# Patient Record
Sex: FEMALE | Race: WHITE | NOT HISPANIC OR LATINO
[De-identification: names, ages, dates, MRNs, and addresses within clinical notes are randomized per-mention and may not be internally consistent; named-entity substitution may affect disease eponyms.]

---

## 2022-02-15 ENCOUNTER — TRANSCRIPTION ENCOUNTER (OUTPATIENT)
Age: 42
End: 2022-02-15

## 2023-05-15 ENCOUNTER — APPOINTMENT (OUTPATIENT)
Dept: GASTROENTEROLOGY | Facility: CLINIC | Age: 43
End: 2023-05-15
Payer: MEDICAID

## 2023-05-15 DIAGNOSIS — K76.0 FATTY (CHANGE OF) LIVER, NOT ELSEWHERE CLASSIFIED: ICD-10-CM

## 2023-05-15 PROCEDURE — 91200 LIVER ELASTOGRAPHY: CPT

## 2023-05-15 NOTE — ASSESSMENT
[FreeTextEntry1] : FibroScan/ Vibration Controlled Transient Elastography (VCTE) Report\par \par PROBE SIZE: M\par \par INDICATION: NAFLD/PENA\par \par REFERRING PHYSICIAN: Dr. Jacky Reddy\par \par  \par PROCEDURE:\par \par Patient was NPO for 4 hours prior to procedure. After providing oral explanations of the procedure to the patient, patient was placed in supine position with right arm in maximum abduction to allow optimal exposure of right lateral abdomen. Patient abdomen was briefly assessed to identify to the terminus of the xyphoid process. The ideal target testing spot was located mid-line and lateral to this point. To acquire proper signals, the skin to liver capsule distance was accessed with the FibroScan probe. Patient was instructed to breathe normally and to abstain from sudden movements during the procedure. Ten shear waves were produced and measurements of the speed of each wave evaluated. Patient tolerated the procedure well and was discharged without incident.\par \par  \par FINDINGS:\par \par - Median shear wave speed of 1.63 meters/second.\par \par - This corresponds to a median Liver Stiffness Score of 8.0 kPa.\par \par - IQR/med 10%\par \par -  dB/m\par  \par RESULTS:\par \par FIBROSIS: F0/F1 Fibrosis\par \par STEATOSIS: S0 - Stage (0-10% steatosis) \par \par The results regarding fibrosis stage and/or steatosis grade is based on current published scientific literature and the provided patient’s diagnosis. Any further medical or surgical intervention should be made by considering the overall medical condition of the patient.

## 2024-09-23 ENCOUNTER — APPOINTMENT (OUTPATIENT)
Dept: NEUROLOGY | Facility: CLINIC | Age: 44
End: 2024-09-23
Payer: COMMERCIAL

## 2024-09-23 VITALS
WEIGHT: 175 LBS | DIASTOLIC BLOOD PRESSURE: 71 MMHG | OXYGEN SATURATION: 99 % | SYSTOLIC BLOOD PRESSURE: 105 MMHG | HEIGHT: 65 IN | HEART RATE: 68 BPM | BODY MASS INDEX: 29.16 KG/M2

## 2024-09-23 DIAGNOSIS — N20.0 CALCULUS OF KIDNEY: ICD-10-CM

## 2024-09-23 DIAGNOSIS — G44.52 NEW DAILY PERSISTENT HEADACHE (NDPH): ICD-10-CM

## 2024-09-23 DIAGNOSIS — Z82.0 FAMILY HISTORY OF EPILEPSY AND OTHER DISEASES OF THE NERVOUS SYSTEM: ICD-10-CM

## 2024-09-23 DIAGNOSIS — K21.9 GASTRO-ESOPHAGEAL REFLUX DISEASE W/OUT ESOPHAGITIS: ICD-10-CM

## 2024-09-23 PROCEDURE — 99205 OFFICE O/P NEW HI 60 MIN: CPT

## 2024-09-23 RX ORDER — OMEPRAZOLE 40 MG/1
40 CAPSULE, DELAYED RELEASE ORAL
Refills: 0 | Status: ACTIVE | COMMUNITY

## 2024-09-23 RX ORDER — MULTIVIT-MIN/IRON/FOLIC ACID/K 18-600-40
CAPSULE ORAL
Refills: 0 | Status: ACTIVE | COMMUNITY

## 2024-09-23 RX ORDER — NABUMETONE 500 MG/1
500 TABLET, FILM COATED ORAL
Qty: 10 | Refills: 0 | Status: COMPLETED | COMMUNITY
Start: 2024-09-23 | End: 2024-09-28

## 2024-09-24 NOTE — HISTORY OF PRESENT ILLNESS
[___ On how many days in the last 3 months did you miss work or school because of your headaches?] : On how many days in the last 3 months did you miss work or school because of your headaches? [unfilled] day(s) [___ How many days in the last 3 months was your productivity at work or school reduced by half or more] : How many days in the last 3 months was your productivity at work or school reduced by half or more because of your headaches? [unfilled] day(s) [___ On how many days in the last 3 months did you not do household work because of your headaches?] : On how many days in the last 3 months did you not do household work because of your headaches? [unfilled] day(s) [___ How many days in the last 3 months was your productivity in household work reduced by half or more] : How many days in the last 3 months was your productivity in household work reduced by half or more because of headaches? [unfilled] day(s) [___ On how many days in the last 3 months did you miss family, social or leisure activities because of your headaches?] : On how many days in the last 3 months did you miss family, social or leisure activities because of your headaches? [unfilled] day(s) [___ On how many days in the last 3 months did you have a headache?] : On how many days in the last 3 months did you have a headache? [unfilled] days [___ On a scale of 1-10, on average how painful were these headaches? (0 = no pain and 10 = pain as bad as it can be)] : On a scale of 1-10, on average how painful were these headaches? [unfilled] of 10 [11 to 20, MIDAS Grade III, Moderate disability] : 11 to 20, MIDAS Grade III, Moderate disability [FreeTextEntry1] : Hiral Wahl is a 44 year old female with medical history significant for GERD, kidney stones, preDM, presenting today for post ED visit.  [place] [facility] 9/22/24: "Patient states that her headache has been happened since Friday started with a pressure behind both eyes, and having some photophobia. Denies history of migraines. States that she has some family members who have had headaches, and also have had cysts removed and she thinks they were intracranial. She states that the headache was not maximal at onset within the 1st 5 minutes and gradually got worse over the last 2 days. Denies any vomiting but states she is very nauseous. Took an ibuprofen yesterday with minimal relief."  HAs began: Friday 9/20. Quality: pounding, or throbbing, holocranial.  Severity:10/10 Disability: yes Duration: from friday to sunday. Frequency: first time this past weekend. Temporal course: gradual build up.  Time of day: started Friday evening.  Pain Free between Attacks: not since Friday. Triggers: none known.  Relieving factors: lay down in dark Exacerbating factors: physical activity.   Prodrome or postdrome: none.  Aura: none   Associated Symptoms: Nausea + Vomiting - Photophobia + Phonophobia: + Osmophobia - Allodynia - Blurred Vision -  Neck pain -  Vertigo - .   Autonomic features: slight congestion even before HA. no eye redness   Treatment present: Acute: OTC analgesia minimal relief. migraine cocktail helpful 90% improvement of headache.     Imaging:  CT HEAD - No acute intracranial hemorrhage, mass effect, or midline shift. If there is concern for acute neurologic compromise, recommend MRI of the brain for further evaluation.   Labs: 9/22/24 Mg 1.8     Additional Social/Work History: Occupation: .  Habits: Caffeine: 1 green tea  ETOH: none Tobacco: none ,  Drugs: none Exercise: not in past month. usually hot sauna workout.  Diet: last wednesday started intermittent fasting, but since HA started back normal diet hydration: 3 bottles water.  Ophthalmologic: yearly checks  Sleep: HA interrupted on Friday. In general poor sleep.  Dental: not UTD Sinus/Allergies: seasonal allergies.  Head Trauma: none  Sex Active/Pregnancy planning: contraception: Menstruation: irregular since 14 y/o Associated with menses: had LMP 2 weeks ago    FH: cousin with aneurysm another cousin with migraine.

## 2024-09-24 NOTE — ASSESSMENT
[FreeTextEntry1] : 44 year old female with medical history significant for GERD, kidney stones, preDM, presenting today for post ED visit. Endorsing HAs onset 9/20/24, constant, pounding/throbbing, holocranial, severe, disabling, daily and no pain free time since onset. Never had HAs before this. A/w nausea, photo/phonophobia, some relief with OTC analgesia and ED migraine cocktail, however pain is still constant. +FMHx intracranial aneurysm and cyst.  CT HEAD - No acute intracranial hemorrhage, mass effect, or midline shift. Labs - Mg, CBC, CMP WNL Physical exam no focal neuro deficit.  Based on clinical history and presentation, patient with new daily persistent headache, they meet criteria for migraine headaches however since this is a new headache we will perform further workup to rule out structural causes.   Recommendations: - start nabumetone 500 mg 1 tab BID with food for NDPH, for 5 days  - pt to call after nabumetone course and give me an update. - consider low dose TCA at next visit - MRI, MRA, MRV head to evaluate for structural or vessel abnormalities - work letter given - lifestyle modifications - regular meals, increase hydration, adequate sleep  Patient to return to office in 1 month, or sooner if needed. Patient understands to seek urgent medical evaluation for any new or worsening symptoms.

## 2024-09-24 NOTE — HISTORY OF PRESENT ILLNESS
[___ On how many days in the last 3 months did you miss work or school because of your headaches?] : On how many days in the last 3 months did you miss work or school because of your headaches? [unfilled] day(s) [___ How many days in the last 3 months was your productivity at work or school reduced by half or more] : How many days in the last 3 months was your productivity at work or school reduced by half or more because of your headaches? [unfilled] day(s) [___ On how many days in the last 3 months did you not do household work because of your headaches?] : On how many days in the last 3 months did you not do household work because of your headaches? [unfilled] day(s) [___ How many days in the last 3 months was your productivity in household work reduced by half or more] : How many days in the last 3 months was your productivity in household work reduced by half or more because of headaches? [unfilled] day(s) [___ On how many days in the last 3 months did you miss family, social or leisure activities because of your headaches?] : On how many days in the last 3 months did you miss family, social or leisure activities because of your headaches? [unfilled] day(s) [___ On how many days in the last 3 months did you have a headache?] : On how many days in the last 3 months did you have a headache? [unfilled] days [___ On a scale of 1-10, on average how painful were these headaches? (0 = no pain and 10 = pain as bad as it can be)] : On a scale of 1-10, on average how painful were these headaches? [unfilled] of 10 [11 to 20, MIDAS Grade III, Moderate disability] : 11 to 20, MIDAS Grade III, Moderate disability [FreeTextEntry1] : Hiral Wahl is a 44 year old female with medical history significant for GERD, kidney stones, preDM, presenting today for post ED visit.  [place] [facility] 9/22/24: "Patient states that her headache has been happened since Friday started with a pressure behind both eyes, and having some photophobia. Denies history of migraines. States that she has some family members who have had headaches, and also have had cysts removed and she thinks they were intracranial. She states that the headache was not maximal at onset within the 1st 5 minutes and gradually got worse over the last 2 days. Denies any vomiting but states she is very nauseous. Took an ibuprofen yesterday with minimal relief."  HAs began: Friday 9/20. Quality: pounding, or throbbing, holocranial.  Severity:10/10 Disability: yes Duration: from friday to sunday. Frequency: first time this past weekend. Temporal course: gradual build up.  Time of day: started Friday evening.  Pain Free between Attacks: not since Friday. Triggers: none known.  Relieving factors: lay down in dark Exacerbating factors: physical activity.   Prodrome or postdrome: none.  Aura: none   Associated Symptoms: Nausea + Vomiting - Photophobia + Phonophobia: + Osmophobia - Allodynia - Blurred Vision -  Neck pain -  Vertigo - .   Autonomic features: slight congestion even before HA. no eye redness   Treatment present: Acute: OTC analgesia minimal relief. migraine cocktail helpful 90% improvement of headache.     Imaging:  CT HEAD - No acute intracranial hemorrhage, mass effect, or midline shift. If there is concern for acute neurologic compromise, recommend MRI of the brain for further evaluation.   Labs: 9/22/24 Mg 1.8     Additional Social/Work History: Occupation: .  Habits: Caffeine: 1 green tea  ETOH: none Tobacco: none ,  Drugs: none Exercise: not in past month. usually hot sauna workout.  Diet: last wednesday started intermittent fasting, but since HA started back normal diet hydration: 3 bottles water.  Ophthalmologic: yearly checks  Sleep: HA interrupted on Friday. In general poor sleep.  Dental: not UTD Sinus/Allergies: seasonal allergies.  Head Trauma: none  Sex Active/Pregnancy planning: contraception: Menstruation: irregular since 12 y/o Associated with menses: had LMP 2 weeks ago    FH: cousin with aneurysm another cousin with migraine.

## 2024-09-24 NOTE — PHYSICAL EXAM
[FreeTextEntry1] : NEURO: Mental Status Oriented to person, place, time, and situation Speech is clear, fluent, and spontaneous. Comprehension and memory intact intermittent tearfulness.   Cranial Nerves Visual fields full to confrontation Pupils equal, round, reactive to light. Extraocular movements intact. No nystagmus or ptosis. Facial sensation intact and symmetric in V1, V2, V3 Facial movement intact and symmetric Uvula midline, soft palate elevates normally Bilateral shoulder shrug intact Tongue midline, no tongue bite sign or deviation on protrusion  No craniofacial tenderness to palpation No palpable or audible TMJ crepitus.  NECK: ROM intact without pain   Motor Tone and bulk intact Shoulder abduction: 5/5 b/l Elbow flexion/extension: 5/5 bl Hand : 5/5 b/l Hip flexion/extension: 5/5 b/l Knee flexion/extension: 5/5 b/l Dorsiflexion/plantar flexion: 5/5 b/l No pronator drift   Sensation Light touch grossly intact   Deep Tendon Reflexes Biceps 2+ b/l Brachioradialis 2+ b/l Patellar 2+ b/l Ankle 2+ b/l   Coordination Normal finger to nose bilaterally No past pointing No tremor appreciated.   Gait Normal stance, stride, and pivot turn Tandem walk intact Heel and toe gait intact. Negative Romberg.     GEN: awake, alert, interactive, no acute distress EYES: sclera white, conjunctiva clear, no redness or discharge ENT: normal appearing outer ears, hearing grossly intact PULM: normal respiratory rhythm and effort, speaking in full sentences without distress, no accessory muscle usage EXT: moving all extremities spontaneously

## 2024-09-24 NOTE — DATA REVIEWED
[FreeTextEntry1] : CT HEAD 9/22/24 FINDINGS VENTRICLES AND SULCI: Normal in size and configuration. INTRA-AXIAL: No mass effect, acute hemorrhage, or midline shift. EXTRA-AXIAL: No mass or fluid collection. Basal cisterns are normal in appearance. VISUALIZED SINUSES:  Clear. TYMPANOMASTOID CAVITIES:  Clear. VISUALIZED ORBITS: Normal. CALVARIUM: Intact. MISCELLANEOUS: None. IMPRESSION: No acute intracranial hemorrhage, mass effect, or midline shift. If there is concern for acute neurologic compromise, recommend MRI of the brain for further evaluation.

## 2024-09-25 NOTE — REASON FOR VISIT
Quality 226: Preventive Care And Screening: Tobacco Use: Screening And Cessation Intervention: Patient screened for tobacco use and is an ex/non-smoker Detail Level: Detailed [FreeTextEntry2] : FibroScan Procedure Visit

## 2024-10-14 ENCOUNTER — APPOINTMENT (OUTPATIENT)
Dept: MRI IMAGING | Facility: CLINIC | Age: 44
End: 2024-10-14

## 2024-10-14 PROCEDURE — A9585: CPT | Mod: JW

## 2024-10-14 PROCEDURE — A9585: CPT

## 2024-10-14 PROCEDURE — 70553 MRI BRAIN STEM W/O & W/DYE: CPT

## 2024-10-14 PROCEDURE — 70546 MR ANGIOGRAPH HEAD W/O&W/DYE: CPT | Mod: 59

## 2024-10-28 ENCOUNTER — APPOINTMENT (OUTPATIENT)
Dept: NEUROLOGY | Facility: CLINIC | Age: 44
End: 2024-10-28
Payer: COMMERCIAL

## 2024-10-28 VITALS
DIASTOLIC BLOOD PRESSURE: 77 MMHG | HEIGHT: 65 IN | SYSTOLIC BLOOD PRESSURE: 111 MMHG | OXYGEN SATURATION: 99 % | HEART RATE: 84 BPM | BODY MASS INDEX: 29.16 KG/M2 | WEIGHT: 175 LBS

## 2024-10-28 DIAGNOSIS — G44.52 NEW DAILY PERSISTENT HEADACHE (NDPH): ICD-10-CM

## 2024-10-28 DIAGNOSIS — G43.909 MIGRAINE, UNSPECIFIED, NOT INTRACTABLE, W/OUT STATUS MIGRAINOSUS: ICD-10-CM

## 2024-10-28 PROCEDURE — 99215 OFFICE O/P EST HI 40 MIN: CPT

## 2025-05-09 ENCOUNTER — NON-APPOINTMENT (OUTPATIENT)
Age: 45
End: 2025-05-09